# Patient Record
Sex: FEMALE | Race: OTHER | NOT HISPANIC OR LATINO
[De-identification: names, ages, dates, MRNs, and addresses within clinical notes are randomized per-mention and may not be internally consistent; named-entity substitution may affect disease eponyms.]

---

## 2022-05-27 PROBLEM — Z00.00 ENCOUNTER FOR PREVENTIVE HEALTH EXAMINATION: Status: ACTIVE | Noted: 2022-05-27

## 2022-05-31 ENCOUNTER — APPOINTMENT (OUTPATIENT)
Dept: OTOLARYNGOLOGY | Facility: CLINIC | Age: 57
End: 2022-05-31
Payer: COMMERCIAL

## 2022-05-31 VITALS
OXYGEN SATURATION: 99 % | TEMPERATURE: 98.7 F | BODY MASS INDEX: 25.57 KG/M2 | HEART RATE: 95 BPM | WEIGHT: 161 LBS | HEIGHT: 66.5 IN | RESPIRATION RATE: 17 BRPM

## 2022-05-31 DIAGNOSIS — Z82.3 FAMILY HISTORY OF STROKE: ICD-10-CM

## 2022-05-31 DIAGNOSIS — Z82.49 FAMILY HISTORY OF ISCHEMIC HEART DISEASE AND OTHER DISEASES OF THE CIRCULATORY SYSTEM: ICD-10-CM

## 2022-05-31 DIAGNOSIS — Z87.09 PERSONAL HISTORY OF OTHER DISEASES OF THE RESPIRATORY SYSTEM: ICD-10-CM

## 2022-05-31 DIAGNOSIS — Z87.891 PERSONAL HISTORY OF NICOTINE DEPENDENCE: ICD-10-CM

## 2022-05-31 DIAGNOSIS — Z85.3 PERSONAL HISTORY OF MALIGNANT NEOPLASM OF BREAST: ICD-10-CM

## 2022-05-31 DIAGNOSIS — Z83.49 FAMILY HISTORY OF OTHER ENDOCRINE, NUTRITIONAL AND METABOLIC DISEASES: ICD-10-CM

## 2022-05-31 DIAGNOSIS — E04.1 NONTOXIC SINGLE THYROID NODULE: ICD-10-CM

## 2022-05-31 DIAGNOSIS — Z86.79 PERSONAL HISTORY OF OTHER DISEASES OF THE CIRCULATORY SYSTEM: ICD-10-CM

## 2022-05-31 DIAGNOSIS — J34.89 OTHER SPECIFIED DISORDERS OF NOSE AND NASAL SINUSES: ICD-10-CM

## 2022-05-31 DIAGNOSIS — Z86.39 PERSONAL HISTORY OF OTHER ENDOCRINE, NUTRITIONAL AND METABOLIC DISEASE: ICD-10-CM

## 2022-05-31 PROCEDURE — 10005 FNA BX W/US GDN 1ST LES: CPT

## 2022-05-31 PROCEDURE — 99205 OFFICE O/P NEW HI 60 MIN: CPT | Mod: 25

## 2022-05-31 PROCEDURE — 31575 DIAGNOSTIC LARYNGOSCOPY: CPT

## 2022-05-31 RX ORDER — CALCIUM CARBONATE/VITAMIN D3 600MG-62.5
CAPSULE ORAL
Refills: 0 | Status: ACTIVE | COMMUNITY

## 2022-05-31 RX ORDER — LOSARTAN POTASSIUM 100 MG/1
100 TABLET, FILM COATED ORAL
Qty: 90 | Refills: 0 | Status: ACTIVE | COMMUNITY
Start: 2021-10-22

## 2022-05-31 RX ORDER — FLUOCINONIDE 0.5 MG/G
0.05 CREAM TOPICAL
Qty: 30 | Refills: 0 | Status: DISCONTINUED | COMMUNITY
Start: 2022-03-15 | End: 2022-05-31

## 2022-05-31 RX ORDER — HYDROCHLOROTHIAZIDE 12.5 MG/1
12.5 TABLET ORAL
Qty: 90 | Refills: 0 | Status: ACTIVE | COMMUNITY
Start: 2021-12-02

## 2022-05-31 RX ORDER — NEBULIZER AND COMPRESSOR
EACH MISCELLANEOUS
Qty: 1 | Refills: 0 | Status: DISCONTINUED | COMMUNITY
Start: 2022-05-12

## 2022-05-31 RX ORDER — ASCORBIC ACID 500 MG
TABLET ORAL
Refills: 0 | Status: ACTIVE | COMMUNITY

## 2022-05-31 RX ORDER — SODIUM CHLORIDE FOR INHALATION 0.9 %
0.9 VIAL, NEBULIZER (ML) INHALATION
Qty: 300 | Refills: 0 | Status: DISCONTINUED | COMMUNITY
Start: 2022-05-12

## 2022-05-31 RX ORDER — BUDESONIDE 0.5 MG/2ML
0.5 INHALANT ORAL
Qty: 60 | Refills: 0 | Status: DISCONTINUED | COMMUNITY
Start: 2022-05-18

## 2022-05-31 RX ORDER — BACILLUS COAGULANS/INULIN 1B-250 MG
CAPSULE ORAL
Refills: 0 | Status: ACTIVE | COMMUNITY

## 2022-05-31 RX ORDER — CHROMIUM 200 MCG
TABLET ORAL
Refills: 0 | Status: ACTIVE | COMMUNITY

## 2022-05-31 RX ORDER — PSYLLIUM HUSK 0.4 G
CAPSULE ORAL
Refills: 0 | Status: ACTIVE | COMMUNITY

## 2022-05-31 RX ORDER — LEVOTHYROXINE SODIUM 75 UG/1
75 TABLET ORAL
Qty: 90 | Refills: 0 | Status: ACTIVE | COMMUNITY
Start: 2021-06-02

## 2022-05-31 RX ORDER — COLD-HOT PACK
EACH MISCELLANEOUS
Refills: 0 | Status: ACTIVE | COMMUNITY

## 2022-05-31 NOTE — PROCEDURE
[Image(s) Captured] : image(s) captured and filed [Unable to Cooperate with Mirror] : patient unable to cooperate with mirror [Gag Reflex] : gag reflex preventing mirror examination [Oxymetazoline HCl] : oxymetazoline HCl [Flexible Endoscope] : examined with the flexible endoscope [Serial Number: ___] : Serial Number: [unfilled] [FreeTextEntry3] : \par ...................................[facility]\par ...........ULTRASOUND-GUIDED THYROID FINE NEEDLE ASPIRATION BIOPSY REPORT\par \par NAME: LAYNE HERNÁNDEZ .....MR# [id number] ......: 1965 ....DATE: 2022 ....TIME: 11:57 AM.\par \par HISTORY/ INDICATIONS: 57- year-old female with a nontoxic multinodular goiter and a enlarging right complex mid to lower lobe nodule.\par \par EXAM: Real-time high-resolution ultrasound imaging of the thyroid gland was performed in the longitudinal and transverse planes with color power Doppler supplementation and image capture.\par \par FINDINGS: A mid to lower lobe nodule measuring 4.24 x 3.59 x 3.15 cm (longitudinal x AP x transverse) was identified and targeted for USG-FNA.  The nodule had the following ultrasonographic characteristics: Mixed solid and cystic, heterogeneous, smoothly marginated, no punctate echogenic foci, grade 2 vascularity on color Doppler, and wider-than-tall.\par \par PROCEDURE: A time out took place and documented for correct patient identifiers, site and side of procedure.  After obtaining informed consent with discussion of risks, benefits and alternatives, the patient was positioned semi-supine, the skin was prepped with alcohol and 0.5 cc of 1% Lidocaine with 1:100,000 epinephrine was injected for local anesthesia. A #25-gauge needle was then passed along the perpendicular plane of the transducer, positioned within the nodule and confirmed with ultrasonography.  Multiple aspirations were made within the nodule with two separate needle punctures, four passes each.  Aspirates were smeared on glass slides and also directly placed into both formalin and cytolyt solutions for cytologic analysis, immunohistochemistry, and possible molecular genomic diagnostics.  Approximately 5 cc of serosanguineous fluid was aspirated with partial collapse of the cyst component of the nodule and the cyst wall was rebiopsied.  The patient tolerated the procedure well without complications and was discharged with signed post-procedure instructions indicating the importance of following up on all results. \par \par ASSESSMENT & PLAN: Successful USG-FNA of a right mid to lower lobe complex cystic hemorrhagic nodule was well tolerated without complications. The patient will be contacted to review the cytologic findings as soon as available for further treatment planning.  A discussion took place with the patient who accepted the responsibility to call the office to review the cytology results if no communication occurs within two weeks.\par \par Electronically signed by referring, interpreting and reporting physician:\par KAMLA CID M.D., FACS on 2022, 12:30 PM.\par \par [facility]: [address], [address],  [place] [address]\par [phone] (voice), [phone] (fax). [de-identified] : The nasal septum is minimally deviated to the left anteriorly with 2 septal perforations. . There are no masses or polyps and the nasal mucosa and secretions are normal. The choanae and posterior nasopharynx are normal without masses or drainage. The Eustachian tube orifices appear patent. The pharynx, including the posterior and lateral pharyngeal walls, the vallecula and base of tongue are normal without ulcerations, lesions or masses. The hypopharynx including the pyriform sinuses open well without pooling of secretions, mucosal lesions or masses. The supraglottic larynx including the epiglottis, petiole, arytenoids, glossoepiglottic, aryepiglottic and pharyngoepiglottic folds are normal without mucosal lesions, ulcerations or masses. The glottis reveals normal false vocal folds. The true vocal folds are glistening white, tense and of equal length, without paralysis, having symmetric mobility on adduction and abduction. There are no mucosal lesions, nodules, cysts, erythroplasia or leukoplakia. The posterior cricoid area has healthy pink mucosa in the interarytenoid area and esophageal inlet. There is [no] thickening/pachydermia of the interarytenoid mucosa suggestive of posterior laryngitis from laryngopharyngeal acid reflux disease. The trachea is clear without narrowing in the immediate subglottic region, without deviation or lesions.  [de-identified] : thyroid neoplasm

## 2022-05-31 NOTE — HISTORY OF PRESENT ILLNESS
[de-identified] : Emi is a 57-year-old female  (not practicing) at home with special needs child who had been monitored for a NTMNG and hypothyroidism since her late 20's on exam and had two miscarriages.  She was on Synthroid until her 40's then [place] thyroid and back to Synthroid 75 mcgs x 7 days this past October due to palpitations. She has been followed by her current Endocrinologist since 2016.  [place] [facility] treated by double mastectomy and Tamofen.  She had been followed after multiple benign FNA biopsies from dominant nodules. A right lobe 1.8 cm nodule biopsied in 2010 (c/w H. 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  Her most recent thyroid ultrasound was obtained in April demonstrating a enlarged left thyroid lobe measuring up to 7.7 cm in sagittal height and a right lobe measuring up to 6 cm in sagittal height.  Within the right lower lobe there is a 4.1 cm complex nodule (4.1 x 3.2 x 3.4, previously 3.2 x 2.7 x 3.1 cm) a slight increase in size since the prior years, 2 nodules within the isthmus measuring 1.5 and 1.0 both of which are smaller and no nodules identified in the left thyroid lobe.  She had also no finding of abnormal cervical lymph nodes.  She already received a surgical consultation and a right thyroid lobectomy was recommended versus a radiofrequency ablation attempt.  Emi denies recent shortness of breath, voice changes, dysphagia, or anterior neck pain.  She feels a sense of pressure when stressed, URI or wearing clothing around the neck. She snores but denies nocturnal arousals.  She does have daytime sleepiness but exhaustion from taking care of a special needs child. She had gained ~ 20 pounds over the past decade or more.  There is no family history of thyroid cancer. Both of her parents became hypothyroid in the later years and possibly had H. Thyroiditis. She denies any known radiation exposures in her youth. She had many X-rays of her knees and arthroscopies. She only smoked in College. She denies fever, body aches, cough, cyanosis, chest burning or  anosmia.  She did test (+) this month.  All family members at home are well.

## 2022-05-31 NOTE — CONSULT LETTER
[Dear  ___] : Dear  [unfilled], [Consult Letter:] : I had the pleasure of evaluating your patient, [unfilled]. [Please see my note below.] : Please see my note below. [Consult Closing:] : Thank you very much for allowing me to participate in the care of this patient.  If you have any questions, please do not hesitate to contact me. [Sincerely,] : Sincerely, [FreeTextEntry3] : \par Brando Damian M.D., FACS, ECNU\par Director Center for Thyroid & Parathyroid Surgery at [facility]\par [facility] [place]\par Certified in Thyroid/Parathyroid/Neck Ultrasound, ECNU/ AIUM\par , Department of Otolaryngology\par [employer] at [facility]\par

## 2022-05-31 NOTE — REASON FOR VISIT
[FreeTextEntry2] : a surgical consultation concerning a NTMNG. [FreeTextEntry1] : PCP is Jose G Blank MD and referrer, Crissy Wooten MD  Endocrinologist

## 2022-06-23 ENCOUNTER — APPOINTMENT (OUTPATIENT)
Dept: SLEEP CENTER | Facility: HOME HEALTH | Age: 57
End: 2022-06-23

## 2022-06-28 ENCOUNTER — APPOINTMENT (OUTPATIENT)
Dept: OTOLARYNGOLOGY | Facility: CLINIC | Age: 57
End: 2022-06-28

## 2022-07-18 ENCOUNTER — APPOINTMENT (OUTPATIENT)
Dept: OTOLARYNGOLOGY | Facility: CLINIC | Age: 57
End: 2022-07-18

## 2022-07-18 VITALS — TEMPERATURE: 98.7 F

## 2022-07-18 DIAGNOSIS — R22.1 LOCALIZED SWELLING, MASS AND LUMP, NECK: ICD-10-CM

## 2022-07-18 DIAGNOSIS — R06.83 SNORING: ICD-10-CM

## 2022-07-18 DIAGNOSIS — E04.2 NONTOXIC MULTINODULAR GOITER: ICD-10-CM

## 2022-07-18 DIAGNOSIS — D44.0 NEOPLASM OF UNCERTAIN BEHAVIOR OF THYROID GLAND: ICD-10-CM

## 2022-07-18 PROCEDURE — 10005 FNA BX W/US GDN 1ST LES: CPT

## 2022-07-18 PROCEDURE — 31575 DIAGNOSTIC LARYNGOSCOPY: CPT

## 2022-07-18 PROCEDURE — 99215 OFFICE O/P EST HI 40 MIN: CPT | Mod: 25

## 2022-07-18 RX ORDER — ALPRAZOLAM 0.5 MG/1
0.5 TABLET ORAL
Qty: 30 | Refills: 0 | Status: ACTIVE | COMMUNITY
Start: 2022-06-17

## 2022-07-28 ENCOUNTER — OUTPATIENT (OUTPATIENT)
Dept: OUTPATIENT SERVICES | Facility: HOSPITAL | Age: 57
LOS: 1 days | End: 2022-07-28
Payer: COMMERCIAL

## 2022-07-28 ENCOUNTER — APPOINTMENT (OUTPATIENT)
Dept: SLEEP CENTER | Facility: HOME HEALTH | Age: 57
End: 2022-07-28

## 2022-07-28 PROCEDURE — 95800 SLP STDY UNATTENDED: CPT

## 2022-07-28 PROCEDURE — 95800 SLP STDY UNATTENDED: CPT | Mod: 26

## 2022-07-29 DIAGNOSIS — G47.33 OBSTRUCTIVE SLEEP APNEA (ADULT) (PEDIATRIC): ICD-10-CM

## 2022-08-02 NOTE — PROCEDURE
[Image(s) Captured] : image(s) captured and filed [Unable to Cooperate with Mirror] : patient unable to cooperate with mirror [Gag Reflex] : gag reflex preventing mirror examination [Topical Lidocaine] : topical lidocaine [Oxymetazoline HCl] : oxymetazoline HCl [Flexible Endoscope] : examined with the flexible endoscope [Serial Number: ___] : Serial Number: [unfilled] [FreeTextEntry3] : \par ...................................[facility]\par ...........ULTRASOUND-GUIDED THYROID FINE NEEDLE ASPIRATION BIOPSY REPORT\par \par NAME: LAYNE HERNÁNDEZ .....MR# [id number] ......: 1965 ....DATE: 2022 ....TIME: 1:11PM.\par \par HISTORY/ INDICATIONS: 57-year-old female with a nontoxic multinodular goiter and a enlarging right complex mid to lower lobe complex nodule, indeterminate on prior FNA.\par \par EXAM: Real-time high-resolution ultrasound imaging of the thyroid gland was performed in the longitudinal and transverse planes with color power Doppler supplementation and image capture.\par \par FINDINGS: A mid to lower lobe nodule measuring 4.20 x 3.74 x 2.43 cm (longitudinal x AP x transverse) was identified and targeted for USG-FNA.  The nodule had the following ultrasonographic characteristics: Mixed solid and cystic, heterogeneous, smoothly marginated, no punctate echogenic foci, grade 2 vascularity on color Doppler, and taller-than wide.\par \par PROCEDURE: A time out took place and documented for correct patient identifiers, site and side of procedure.  After obtaining informed consent with discussion of risks, benefits and alternatives, the patient was positioned semi-supine, the skin was prepped with alcohol and 0.5 cc of 1% Lidocaine with 1:100,000 epinephrine was injected for local anesthesia. A #25-gauge needle was then passed along the perpendicular plane of the transducer, positioned within the nodule and confirmed with ultrasonography.  Multiple aspirations were made within the nodule with two separate needle punctures, four passes each.  Aspirates were smeared on glass slides and also directly placed into both formalin and cytolyt solutions for cytologic analysis, immunohistochemistry, and possible molecular genomic diagnostics.   The patient tolerated the procedure well without complications and was discharged with signed post-procedure instructions indicating the importance of following up on all results. \par \par ASSESSMENT & PLAN: Successful USG-FNA of a right mid to lower lobe complex cystic nodule was well tolerated without complications. The patient will be contacted to review the cytologic findings as soon as available for further treatment planning.  A discussion took place with the patient who accepted the responsibility to call the office to review the cytology results if no communication occurs within two weeks.\par \par Electronically signed by referring, interpreting and reporting physician:aliya CID M.D., FACS on 2022, 1:23 PM.\par \par [facility]: [address], [address] [place] [address]\par [phone] (voice), [phone] (fax). [de-identified] : The nasal septum is minimally deviated to the left anteriorly with 2 septal perforations. . There are no masses or polyps and the nasal mucosa and secretions are normal. The choanae and posterior nasopharynx are normal without masses or drainage. The Eustachian tube orifices appear patent. The pharynx, including the posterior and lateral pharyngeal walls, the vallecula and base of tongue are normal without ulcerations, lesions or masses. The hypopharynx including the pyriform sinuses open well without pooling of secretions, mucosal lesions or masses. The supraglottic larynx including the epiglottis, petiole, arytenoids, glossoepiglottic, aryepiglottic and pharyngoepiglottic folds are normal without mucosal lesions, ulcerations or masses. The glottis reveals normal false vocal folds. The true vocal folds are glistening white, tense and of equal length, without paralysis, having symmetric mobility on adduction and abduction. There are no mucosal lesions, nodules, cysts, erythroplasia or leukoplakia. The posterior cricoid area has healthy pink mucosa in the interarytenoid area and esophageal inlet. There is minimal thickening/pachydermia of the interarytenoid mucosa suggestive of posterior laryngitis from laryngopharyngeal acid reflux disease. The trachea is clear without narrowing in the immediate subglottic region and minimall deviated to the left.  [de-identified] : thyroid neoplasm s/p FNAB and indeterminate cytopathology

## 2022-08-02 NOTE — CONSULT LETTER
[Dear  ___] : Dear  [unfilled], [Consult Letter:] : I had the pleasure of evaluating your patient, [unfilled]. [Please see my note below.] : Please see my note below. [Consult Closing:] : Thank you very much for allowing me to participate in the care of this patient.  If you have any questions, please do not hesitate to contact me. [Sincerely,] : Sincerely, [FreeTextEntry3] : \par Brando Damian M.D., FACS, ECNU\par Director Center for [facility] Parathyroid Surgery at [facility]\par [facility] [place]\par Certified in Thyroid/Parathyroid/Neck Ultrasound, ECNU/ AIUM\par , Department of Otolaryngology\par [facility] [employer] at [facility]\par

## 2022-08-02 NOTE — REASON FOR VISIT
[FreeTextEntry2] : a f/u surgical consultation concerning a NTMNG. [FreeTextEntry1] : PCP is Jose G Blank MD and referrer, Crissy Wooten MD  Endocrinologist

## 2022-08-02 NOTE — HISTORY OF PRESENT ILLNESS
[de-identified] : Emi is a 57-year-old female  (not practicing) at home with special needs child who had been monitored for a NTMNG and hypothyroidism since her late 20's on exam and had two miscarriages.  She was on Synthroid until her 40's then [place] thyroid and back to Synthroid 75 mcgs x 7 days this past October due to palpitations. She has been followed by her current Endocrinologist since 2016.  [place] [facility] treated by double mastectomy and Tamofen.  She had been followed after multiple benign FNA biopsies from dominant nodules. A right lobe 1.8 cm nodule biopsied in 2010 (c/w H. 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  Her most recent thyroid ultrasound was obtained in April demonstrating a enlarged left thyroid lobe measuring up to 7.7 cm in sagittal height and a right lobe measuring up to 6 cm in sagittal height.  Within the right lower lobe there is a 4.1 cm complex nodule (4.1 x 3.2 x 3.4, previously 3.2 x 2.7 x 3.1 cm) a slight increase in size since the prior years, 2 nodules within the isthmus measuring 1.5 and 1.0 both of which are smaller and no nodules identified in the left thyroid lobe.  She had also no finding of abnormal cervical lymph nodes.  She already received a surgical consultation and a right thyroid lobectomy was recommended versus a radiofrequency ablation attempt.  Emi denies recent shortness of breath, voice changes, dysphagia, or anterior neck pain.  She feels a sense of pressure when stressed, URI or wearing clothing around the neck. She snores but denies nocturnal arousals.  She does have daytime sleepiness but exhaustion from taking care of a special needs child. She had gained ~ 20 pounds over the past decade or more.  There is no family history of thyroid cancer. Both of her parents became hypothyroid in the later years and possibly had H. Thyroiditis. She denies any known radiation exposures in her youth. She had many X-rays of her knees and arthroscopies. She only smoked in College. She denies fever, body aches, cough, cyanosis, chest burning or  anosmia.  She did test (+) this month.  All family members at home are well. \par  [FreeTextEntry1] : Emi returns after she had an FNA biopsy from a 4.2 cm complex right mid to lower lobe thyroid nodule.  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  A ThyroSeq tube was not sent for possible molecular analysis last visit and she is here today for possible repeat FNA biopsy and molecular genomics to rule out the small possibility of a Hurthle cell carcinoma. She is now expressing interest in having a thyroidectomy for concerns regarding the size of the nodule and its indeterminate nature.  She did not have a left lobe nodule on prior ultrasound exams dating back to 2018 but there is a left sided hypoechoic solid 1 cm isthmus nodule with possible microcalcifications, stable to smaller in size but described on prior reports as isoechoic without suspicious features.

## 2022-08-16 ENCOUNTER — APPOINTMENT (OUTPATIENT)
Dept: OTOLARYNGOLOGY | Facility: CLINIC | Age: 57
End: 2022-08-16

## 2023-02-28 ENCOUNTER — APPOINTMENT (OUTPATIENT)
Dept: OTOLARYNGOLOGY | Facility: CLINIC | Age: 58
End: 2023-02-28

## 2023-06-05 ENCOUNTER — TRANSCRIPTION ENCOUNTER (OUTPATIENT)
Age: 58
End: 2023-06-05